# Patient Record
Sex: FEMALE | Race: WHITE
[De-identification: names, ages, dates, MRNs, and addresses within clinical notes are randomized per-mention and may not be internally consistent; named-entity substitution may affect disease eponyms.]

---

## 2017-10-11 LAB
BASOPHILS # BLD: 0.03 K/UL (ref 0–0.2)
BASOPHILS NFR BLD: 0.3 %
BUN SERPL-MCNC: 21 MG/DL (ref 7–18)
CALCIUM SERPL-MCNC: 9.1 MG/DL (ref 8.5–10.1)
CO2 SERPL-SCNC: 27 MMOL/L (ref 21–32)
CREAT SERPL-MCNC: 1.2 MG/DL (ref 0.6–1.2)
EOS ABS #: 0.15 K/UL (ref 0–0.5)
EOSINOPHIL NFR BLD AUTO: 273 K/UL (ref 130–400)
GLUCOSE SERPL-MCNC: 99 MG/DL (ref 70–99)
HCT VFR BLD CALC: 39.8 % (ref 37–47)
HGB BLD-MCNC: 13.1 G/DL (ref 12–16)
IG#: 0.02 K/UL (ref 0–0.02)
IMM GRANULOCYTES NFR BLD AUTO: 26.4 %
INR PPP: 0.9 (ref 0.9–1.1)
LYMPHOCYTES # BLD: 2.28 K/UL (ref 1.2–3.4)
MCH RBC QN AUTO: 30.2 PG (ref 25–34)
MCHC RBC AUTO-ENTMCNC: 32.9 G/DL (ref 32–36)
MCV RBC AUTO: 91.7 FL (ref 80–100)
MONO ABS #: 0.6 K/UL (ref 0.11–0.59)
MONOCYTES NFR BLD: 6.9 %
NEUT ABS #: 5.57 K/UL (ref 1.4–6.5)
NEUTROPHILS # BLD AUTO: 1.7 %
NEUTROPHILS NFR BLD AUTO: 64.5 %
PMV BLD AUTO: 9.5 FL (ref 7.4–10.4)
POTASSIUM SERPL-SCNC: 3.9 MMOL/L (ref 3.5–5.1)
PTT PATIENT: 28.3 SECONDS (ref 21–31)
RED CELL DISTRIBUTION WIDTH CV: 12.9 % (ref 11.5–14.5)
RED CELL DISTRIBUTION WIDTH SD: 43.5 FL (ref 36.4–46.3)
SODIUM SERPL-SCNC: 142 MMOL/L (ref 136–145)
WBC # BLD AUTO: 8.65 K/UL (ref 4.8–10.8)

## 2017-10-11 NOTE — DIAGNOSTIC IMAGING REPORT
CHEST 2 VIEWS ROUTINE



CLINICAL HISTORY: Preoperative chest    



COMPARISON STUDY:  No previous studies for comparison.



FINDINGS: The cardiac and mediastinal contours are normal. There is no evidence

of focal pulmonary consolidation. There is no evidence of failure. No pleural

effusions are visualized.[ There are surgical clips project over the right

axillary region. There are amorphous calcifications project over the right lower

lung zone. It is unclear whether these are intra or extrathoracic.



IMPRESSION: No active disease in the chest.







Electronically signed by:  Porfirio Galvan M.D.

10/11/2017 3:32 PM



Dictated Date/Time:  10/11/2017 3:31 PM

## 2017-10-11 NOTE — PAT MEDICATION INSTRUCTIONS
Service Date


Oct 11, 2017.





Current Home Medication List


Albuterol Hfa (Ventolin Hfa), 2 PUFFS INH QID PRN for RN


Budesonide/Formoterol Fumarate (Symbicort 160/4.5 Inhaler ), 2 PUFFS INH BID


Celecoxib (CeleBREX), 200 MG PO QAM


Cholecalciferol (Vitamin D3), 4,000 UNITS PO 2XWWEEK


Hydrocodone/Acetaminophen 5MG/325MG (Norco 5MG/325MG), 1 TABLET PO TID PRN for N


Lisinopril/Hctz (Zestoretic 20MG/25MG), 1 TAB PO QAM


Montelukast Sodium (Montelukast Sodium), 1 TAB PO QAM


Prednisone (Prednisone), 5 MG PO PRN


Simvastatin (Zocor), 20 MG PO QAM


[Magnesium Ox], 250 MG PO QAM





Medication Instructions


For Your Scheduled Surgery 





- Ask your surgeon for instructions for:


Celecoxib (CeleBREX), 200 MG PO QAM








- Hold the following medications the morning of surgery:


Prednisone (Prednisone), 5 MG PO PRN


Lisinopril/Hctz (Zestoretic 20MG/25MG), 1 TAB PO QAM


Cholecalciferol (Vitamin D3), 4,000 UNITS PO 2XWWEEK


[Magnesium Ox], 250 MG PO QAM








- Take the following medications the morning of surgery with a sip of water:


Simvastatin (Zocor), 20 MG PO QAM


Montelukast Sodium (Montelukast Sodium), 1 TAB PO QAM


Hydrocodone/Acetaminophen 5MG/325MG (Norco 5MG/325MG), 1 TABLET PO TID PRN (if 

needed, can use up to four hours before surgery)


Albuterol Hfa (Ventolin Hfa), 2 PUFFS INH QID PRN for RN (BRING WITH YOU TO THE 

HOSPITAL ON THE DAY OF SURGERY)


Budesonide/Formoterol Fumarate (Symbicort 160/4.5 Inhaler ), 2 PUFFS INH BID








- Take the following medications as scheduled the night before surgery:


Budesonide/Formoterol Fumarate (Symbicort 160/4.5 Inhaler ), 2 PUFFS INH BID


Albuterol Hfa (Ventolin Hfa), 2 PUFFS INH QID PRN for RN


Hydrocodone/Acetaminophen 5MG/325MG (Norco 5MG/325MG), 1 TABLET PO TID PRN











If you have any questions please call us at [phone] or [phone] or 

[phone]

## 2017-11-08 NOTE — HISTORY & PHYSICAL EXAMINATION
DATE OF ADMISSION:  11/10/2017

 

CHIEF COMPLAINT:  Primary osteoarthritis of the right hip.

 

HISTORY OF PRESENT ILLNESS:  Mary is a pleasant 67-year-old female who has

been having a several year history of right hip and groin pain.  We tried

extensive conservative treatment including intraarticular hip injections and

physical therapy, but unfortunately she continues to have pain.  X-rays and

clinical examination have been diagnostic for primary osteoarthritis of the

right hip.  After failing extensive conservative treatment, she elected to

proceed with a right total hip arthroplasty.

 

PAST MEDICAL HISTORY:  Significant for asthma, COPD, osteoarthritis, low back

pain, obesity and breast cancer.

 

PAST SURGICAL HISTORY:  Significant for an appendectomy, right parotidectomy

and lumpectomy.

 

ALLERGIES:  None.

 

MEDICATIONS:  Symbicort 160/4.5 mg 2 puffs twice a day, Ventolin 2 puffs

every 4 hours as needed, Celebrex 200 mg daily, simvastatin 20 mg daily,

Singulair 10 mg daily, lisinopril/HCTZ 20/125 mg daily, and prednisone 5 mg

daily as needed.

 

FAMILY HISTORY:  Noncontributory.

 

SOCIAL HISTORY:  She is .  She has 1-2 drinks a day.  She has a

42-year history of a pack a day smoking, but quit in 2014.  She does little

activity.

 

REVIEW OF SYSTEMS:  She complains of right hip pain.  All other pertinent

review of systems is negative.

 

PHYSICAL EXAMINATION:

GENERAL:  She is awake, alert and oriented x3.  She is in no apparent

distress.  She is very pleasant.

HEENT:  Pupils are equal, round and reactive to light.  Extraocular motion

intact.  Oral mucosa is pink and moist.

HEART:  Regular rate per radial pulse.

LUNGS:  Tawanna symmetrically bilaterally with no audible breath sounds.

ABDOMEN:  Soft, nontender, and nondistended.

MUSCULOSKELETAL:  On physical examination of the right hip, she ambulates

with a cane.  She has significant pain with internal and external rotation of

the right hip.  Her leg lengths are essentially equal.  She is

neurovascularly intact.

 

IMAGING DATA:  X-rays of the right hip do show advanced osteoarthritis with

joint space narrowing and osteophyte formation.

 

IMPRESSION:  Primary osteoarthritis of the right hip.

 

PLAN:  We will proceed with a right total hip arthroplasty.  Postoperatively,

she will be started on aspirin for DVT prophylaxis and kept in the hospital

for postoperative medical management.

## 2017-11-10 ENCOUNTER — HOSPITAL ENCOUNTER (INPATIENT)
Dept: HOSPITAL 45 - C.ACU | Age: 67
LOS: 4 days | Discharge: HOME | DRG: 470 | End: 2017-11-14
Attending: ORTHOPAEDIC SURGERY | Admitting: ORTHOPAEDIC SURGERY
Payer: COMMERCIAL

## 2017-11-10 VITALS
TEMPERATURE: 97.88 F | DIASTOLIC BLOOD PRESSURE: 66 MMHG | HEART RATE: 102 BPM | OXYGEN SATURATION: 96 % | SYSTOLIC BLOOD PRESSURE: 102 MMHG

## 2017-11-10 VITALS
DIASTOLIC BLOOD PRESSURE: 71 MMHG | OXYGEN SATURATION: 99 % | HEART RATE: 80 BPM | TEMPERATURE: 97.52 F | SYSTOLIC BLOOD PRESSURE: 114 MMHG

## 2017-11-10 VITALS
TEMPERATURE: 97.34 F | OXYGEN SATURATION: 100 % | HEART RATE: 80 BPM | SYSTOLIC BLOOD PRESSURE: 104 MMHG | DIASTOLIC BLOOD PRESSURE: 62 MMHG

## 2017-11-10 VITALS
WEIGHT: 205.25 LBS | BODY MASS INDEX: 35.04 KG/M2 | BODY MASS INDEX: 35.04 KG/M2 | HEIGHT: 64 IN | HEIGHT: 64 IN | WEIGHT: 205.25 LBS

## 2017-11-10 VITALS
TEMPERATURE: 97.34 F | SYSTOLIC BLOOD PRESSURE: 112 MMHG | HEART RATE: 92 BPM | DIASTOLIC BLOOD PRESSURE: 73 MMHG | OXYGEN SATURATION: 99 %

## 2017-11-10 VITALS
TEMPERATURE: 98.06 F | OXYGEN SATURATION: 93 % | HEART RATE: 102 BPM | DIASTOLIC BLOOD PRESSURE: 63 MMHG | SYSTOLIC BLOOD PRESSURE: 100 MMHG

## 2017-11-10 VITALS
OXYGEN SATURATION: 94 % | DIASTOLIC BLOOD PRESSURE: 72 MMHG | SYSTOLIC BLOOD PRESSURE: 144 MMHG | HEART RATE: 96 BPM | TEMPERATURE: 98.6 F

## 2017-11-10 VITALS
SYSTOLIC BLOOD PRESSURE: 100 MMHG | TEMPERATURE: 97.34 F | HEART RATE: 86 BPM | DIASTOLIC BLOOD PRESSURE: 66 MMHG | OXYGEN SATURATION: 100 %
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
## 2017-11-10 DIAGNOSIS — M16.11: Primary | ICD-10-CM

## 2017-11-10 DIAGNOSIS — J45.909: ICD-10-CM

## 2017-11-10 DIAGNOSIS — Z85.3: ICD-10-CM

## 2017-11-10 DIAGNOSIS — M96.89: ICD-10-CM

## 2017-11-10 DIAGNOSIS — D62: ICD-10-CM
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
## 2017-11-10 DIAGNOSIS — Z79.899: ICD-10-CM

## 2017-11-10 DIAGNOSIS — Z87.891: ICD-10-CM

## 2017-11-10 DIAGNOSIS — J44.9: ICD-10-CM

## 2017-11-10 DIAGNOSIS — E66.9: ICD-10-CM

## 2017-11-10 PROCEDURE — 0QQ60ZZ REPAIR RIGHT UPPER FEMUR, OPEN APPROACH: ICD-10-PCS | Performed by: ORTHOPAEDIC SURGERY

## 2017-11-10 PROCEDURE — 0SR9049 REPLACEMENT OF RIGHT HIP JOINT WITH CERAMIC ON POLYETHYLENE SYNTHETIC SUBSTITUTE, CEMENTED, OPEN APPROACH: ICD-10-PCS | Performed by: ORTHOPAEDIC SURGERY

## 2017-11-10 RX ADMIN — OXYCODONE HYDROCHLORIDE PRN MG: 5 TABLET ORAL at 16:26

## 2017-11-10 RX ADMIN — MORPHINE SULFATE PRN MG: 2 INJECTION, SOLUTION INTRAMUSCULAR; INTRAVENOUS at 20:35

## 2017-11-10 RX ADMIN — ACETAMINOPHEN SCH MLS/HR: 10 INJECTION, SOLUTION INTRAVENOUS at 23:35

## 2017-11-10 RX ADMIN — STANDARDIZED SENNA CONCENTRATE SCH MG: 8.6 TABLET ORAL at 21:14

## 2017-11-10 RX ADMIN — OXYCODONE HYDROCHLORIDE PRN MG: 5 TABLET ORAL at 21:21

## 2017-11-10 RX ADMIN — CEFAZOLIN SCH MLS/MIN: 10 INJECTION, POWDER, FOR SOLUTION INTRAVENOUS at 17:55

## 2017-11-10 RX ADMIN — BUDESONIDE AND FORMOTEROL FUMARATE DIHYDRATE SCH PUFFS: 160; 4.5 AEROSOL RESPIRATORY (INHALATION) at 21:14

## 2017-11-10 RX ADMIN — SODIUM CHLORIDE SCH MLS/HR: 900 INJECTION, SOLUTION INTRAVENOUS at 16:49

## 2017-11-10 RX ADMIN — Medication SCH MG: at 21:18

## 2017-11-10 RX ADMIN — TRANEXAMIC ACID SCH MLS/MIN: 100 INJECTION, SOLUTION INTRAVENOUS at 16:11

## 2017-11-10 RX ADMIN — DOCUSATE SODIUM SCH MG: 100 CAPSULE, LIQUID FILLED ORAL at 21:13

## 2017-11-10 RX ADMIN — TRANEXAMIC ACID SCH MLS/MIN: 100 INJECTION, SOLUTION INTRAVENOUS at 06:43

## 2017-11-10 RX ADMIN — ACETAMINOPHEN SCH MLS/HR: 10 INJECTION, SOLUTION INTRAVENOUS at 16:46

## 2017-11-10 NOTE — OPERATIVE REPORT
DATE OF OPERATION:  11/10/2017

 

PREOPERATIVE DIAGNOSIS:  Primary osteoarthritis of the right hip.

 

POSTOPERATIVE DIAGNOSIS:  Same.

 

PROCEDURE:  Right total hip arthroplasty.  

 

SURGEON:  Dr. Magdiel Kirby.

 

ASSISTANT:  Andre Evans PA-C.  

 

ANESTHESIA:  Spinal that was converted to general.

 

COMPLICATIONS:  Small  crack in the proximal femur that was treated with a

cerclage wire and then a long cemented stem.

 

CONDITION:  Stable to PACU.

 

IMPLANTS USED:  I used a George CRC size 13 x 170 cemented stem with a size

32  ceramic head with a -3 neck and a 48 mm cup with a size 32 E-poly liner

and a single 20 mm screw.

 

INDICATIONS:  Mary is a pleasant 67-year-old female who presented to my

office with chronic severe right hip and groin pain.  X-rays and clinical

examination were diagnostic for severe osteoarthritis of the right hip. 

After failing conservative treatment, she elected to undergo a right total

hip arthroplasty.

 

OPERATION AND FINDINGS:  On 11/10/2017 she arrived at [facility]

for the above procedure.  She was seen in the preoperative holding area and

the operative extremity is identified and signed.  She was given a

preoperative antibiotic and a spinal anesthetic.  She was taken back to the

operating room, laid on the table in supine position and put under basic

sedation.  The right hip was then brought out to a Purist leg positioner. 

The right hip was then prepped and draped in sterile fashion.  Time-out was

done and the patient and operative extremity was properly identified.

 

An anterior approach was used.  Dissection was taken down through the fascia

and the tensor muscle was retracted laterally and the rectus was retracted

medially.  The circumflex vessels were ligated and the capsule was incised

and tagged for later repair.  The femoral neck was then resected and the

acetabulum was exposed.  Sequential reaming up to a size 47 reamer was done. 

This gave good circumferential  bleeding bone.  A 48 mm cup was then impacted

into place.  A single 20 mm screw was placed and E-poly liner was snapped

into place.  The cup positioning was checked under fluoroscopy.  The proximal

femur was then exposed.  The canal was opened and a single broach was placed

down the canal.  I was not real happy with the alignment so I got an x-ray. 

The x-rays showed that the broach had penetrated the posterolateral cortex. 

Unfortunately I then noticed a crack going vertically up through the greater

trochanter.  I placed a cerclage wire around the proximal femur.  I then did

sequential broaching and the broaching looked good.  I decided to cement down

the stem just for added stability.  The stem was cemented and x-rays showed

that the cemented stem had also gone out the posterolateral cortex.  The stem

was then removed and the cement was removed with an Ultra-Drive device.  Once

this cement was removed a size 13 x 170 George CRC long stem stem was

trialed.  I was able to easily get it down the canal.  A size 32 -3 neck was

then trialed and the hip was reduced.  Fluoroscopic x-rays showed anatomic

alignment.  The final size 13 x 170 George stem was then cemented in place. 

A 32 mm ceramic head with -3 neck was then impacted into place.  The hip was

reduced.  Final fluoroscopic x-rays were taken.  I was happy with the overall

alignment, with the version and the leg lengths were back anatomically.  The

surrounding soft tissues were injected with 100 mL orthopedic pain control

cocktail.  The surrounding soft tissues were then irrigated with 3 liters of

normal saline solution with bacitracin.  Midway through the case, all gloves

and gowns were changed and she got another dose of antibiotics and we were

about 2-1/2 hours into the case.  A drain was placed.  The fascia was then

closed with #1 PDS suture.  Skin was closed with 3-0 Vicryl,  3-0 V-Loc

suture and staples.  Prinovac dressing was placed.  She did have a little

abrasion at the superior aspect of her incision that was documented.  She was

then extubated and taken to the postanesthesia care unit in stable condition.

 She tolerated the procedure well.

 

 

I attest to the content of the Intraoperative Record and any orders documented 
therein. Any exceptions are noted below.

 

 

 

JIM

## 2017-11-10 NOTE — DIAGNOSTIC IMAGING REPORT
AP PELVIS, CROSSTABLE LATERAL RIGHT HIP



History: Right total hip arthroplasty. Degenerative arthritis. Postop.



FINDINGS: The patient is status post a right total hip arthroplasty. The

hardware is intact. No fracture or dislocation. Skin staples and surgical drains

are in place.



IMPRESSION:  

Right total hip arthroplasty. No evidence for hardware complication







Electronically signed by:  Subhash Qureshi M.D.

11/10/2017 2:25 PM



Dictated Date/Time:  11/10/2017 2:23 PM

## 2017-11-10 NOTE — [facility] POST OPERATIVE BRIEF NOTE
Immediate Operative Summary


Operative Date


Nov 10, 2017.





Pre-Operative Diagnosis





Primary Osteoarthritis of Right Hip





Post-Operative Diagnosis





Same as preoperative





Procedure(s) Performed





Right Anterior Total Hip Arthroplasty, Cemented, with Eh Garcia





Surgeon


Dr. Magdiel Kirby





Assistant Surgeon(s)


Andre Evans PA-C





Estimated Blood Loss


500 ml





Findings


as above





Specimens





A.) Right Femoral Head





Complication(s)


small crack around proximal femur that required cabling then a cemented stem





Disposition


Recovery Room / PACU

## 2017-11-10 NOTE — DIAGNOSTIC IMAGING REPORT
R HIP UNILATERAL 1 VIEW



CLINICAL HISTORY: RT ANTERIOR TOTAL hip arthroplasty.



COMPARISON STUDY:  Right hip 10/11/2017.



FINDINGS: Total fluoroscopy time was 1 minute and 27 seconds. 4 fluoroscopic

spot images of the right hip. There is a right total hip arthroplasty. The

hardware appears intact. No fracture or dislocation.



IMPRESSION:  Fluoroscopy provided for right total hip arthroplasty. 





 







Electronically signed by:  Subhash Qureshi M.D.

11/10/2017 1:23 PM



Dictated Date/Time:  11/10/2017 1:22 PM

## 2017-11-10 NOTE — ANESTHESIOLOGY PROGRESS NOTE
Anesthesia Post Op Note


Date & Time


Nov 10, 2017 at 14:58





Vital Signs


Pain Intensity:  4





Vital Signs Past 12 Hours








  Date Time  Temp Pulse Resp B/P (MAP) Pulse Ox O2 Delivery O2 Flow Rate FiO2


 


11/10/17 14:55  79 13 104/60 94 Nasal Cannula 2 


 


11/10/17 14:45  77 14 108/71 98 Nasal Cannula 2 


 


11/10/17 14:35  74 15 97/66 99 Nasal Cannula 2 


 


11/10/17 14:25  69 14 100/65 100 Oxymask 10 


 


11/10/17 14:15  76 18 129/57 100 Oxymask 10 


 


11/10/17 14:07 36.0 70 16 123/71 100 Oxymask 10 


 


11/10/17 05:40 37 96 20 144/72 94 Room Air  











Notes


Mental Status:  alert / awake / arousable, participated in evaluation


Pt Amnestic to Procedure:  Yes


Nausea / Vomiting:  adequately controlled


Pain:  adequately controlled


Airway Patency, RR, SpO2:  stable & adequate


BP & HR:  stable & adequate


Hydration State:  stable & adequate


Neuraxial Anesthesia:  was administered, sensory block is resolving


Anesthetic Complications:  no major complications apparent

## 2017-11-11 VITALS
HEART RATE: 105 BPM | DIASTOLIC BLOOD PRESSURE: 62 MMHG | SYSTOLIC BLOOD PRESSURE: 104 MMHG | OXYGEN SATURATION: 93 % | TEMPERATURE: 98.24 F

## 2017-11-11 VITALS
SYSTOLIC BLOOD PRESSURE: 99 MMHG | TEMPERATURE: 98.06 F | OXYGEN SATURATION: 92 % | HEART RATE: 103 BPM | DIASTOLIC BLOOD PRESSURE: 56 MMHG

## 2017-11-11 VITALS
HEART RATE: 107 BPM | SYSTOLIC BLOOD PRESSURE: 101 MMHG | DIASTOLIC BLOOD PRESSURE: 63 MMHG | OXYGEN SATURATION: 98 % | TEMPERATURE: 98.24 F

## 2017-11-11 VITALS — TEMPERATURE: 98.6 F | DIASTOLIC BLOOD PRESSURE: 65 MMHG | SYSTOLIC BLOOD PRESSURE: 103 MMHG | HEART RATE: 103 BPM

## 2017-11-11 VITALS
DIASTOLIC BLOOD PRESSURE: 67 MMHG | OXYGEN SATURATION: 98 % | TEMPERATURE: 97.88 F | SYSTOLIC BLOOD PRESSURE: 126 MMHG | HEART RATE: 96 BPM

## 2017-11-11 VITALS
HEART RATE: 112 BPM | DIASTOLIC BLOOD PRESSURE: 64 MMHG | SYSTOLIC BLOOD PRESSURE: 122 MMHG | OXYGEN SATURATION: 93 % | TEMPERATURE: 97.88 F

## 2017-11-11 VITALS
SYSTOLIC BLOOD PRESSURE: 110 MMHG | TEMPERATURE: 98.24 F | HEART RATE: 98 BPM | DIASTOLIC BLOOD PRESSURE: 66 MMHG | OXYGEN SATURATION: 94 %

## 2017-11-11 VITALS
TEMPERATURE: 98.24 F | DIASTOLIC BLOOD PRESSURE: 70 MMHG | SYSTOLIC BLOOD PRESSURE: 107 MMHG | HEART RATE: 105 BPM | OXYGEN SATURATION: 95 %

## 2017-11-11 VITALS
OXYGEN SATURATION: 93 % | SYSTOLIC BLOOD PRESSURE: 107 MMHG | DIASTOLIC BLOOD PRESSURE: 55 MMHG | TEMPERATURE: 98.06 F | HEART RATE: 103 BPM

## 2017-11-11 VITALS
DIASTOLIC BLOOD PRESSURE: 65 MMHG | SYSTOLIC BLOOD PRESSURE: 103 MMHG | TEMPERATURE: 98.6 F | OXYGEN SATURATION: 93 % | HEART RATE: 100 BPM

## 2017-11-11 LAB
BASOPHILS # BLD: 0.01 K/UL (ref 0–0.2)
BASOPHILS NFR BLD: 0.1 %
BUN SERPL-MCNC: 27 MG/DL (ref 7–18)
CALCIUM SERPL-MCNC: 7 MG/DL (ref 8.5–10.1)
CO2 SERPL-SCNC: 24 MMOL/L (ref 21–32)
CREAT SERPL-MCNC: 1.33 MG/DL (ref 0.6–1.2)
EOS ABS #: 0 K/UL (ref 0–0.5)
EOSINOPHIL NFR BLD AUTO: 193 K/UL (ref 130–400)
GLUCOSE SERPL-MCNC: 139 MG/DL (ref 70–99)
HCT VFR BLD CALC: 25.2 % (ref 37–47)
HGB BLD-MCNC: 8.5 G/DL (ref 12–16)
IG#: 0.05 K/UL (ref 0–0.02)
IMM GRANULOCYTES NFR BLD AUTO: 7.5 %
LYMPHOCYTES # BLD: 1.06 K/UL (ref 1.2–3.4)
MCH RBC QN AUTO: 31 PG (ref 25–34)
MCHC RBC AUTO-ENTMCNC: 33.7 G/DL (ref 32–36)
MCV RBC AUTO: 92 FL (ref 80–100)
MONO ABS #: 1.29 K/UL (ref 0.11–0.59)
MONOCYTES NFR BLD: 9.1 %
NEUT ABS #: 11.75 K/UL (ref 1.4–6.5)
NEUTROPHILS # BLD AUTO: 0 %
NEUTROPHILS NFR BLD AUTO: 82.9 %
PMV BLD AUTO: 9.6 FL (ref 7.4–10.4)
POTASSIUM SERPL-SCNC: 4.5 MMOL/L (ref 3.5–5.1)
RED CELL DISTRIBUTION WIDTH CV: 13.5 % (ref 11.5–14.5)
RED CELL DISTRIBUTION WIDTH SD: 45.5 FL (ref 36.4–46.3)
SODIUM SERPL-SCNC: 139 MMOL/L (ref 136–145)
WBC # BLD AUTO: 14.16 K/UL (ref 4.8–10.8)

## 2017-11-11 RX ADMIN — DOCUSATE SODIUM SCH MG: 100 CAPSULE, LIQUID FILLED ORAL at 22:12

## 2017-11-11 RX ADMIN — LISINOPRIL AND HYDROCHLOROTHIAZIDE SCH TAB: 25; 20 TABLET ORAL at 07:51

## 2017-11-11 RX ADMIN — SIMVASTATIN SCH MG: 20 TABLET, FILM COATED ORAL at 07:51

## 2017-11-11 RX ADMIN — SODIUM CHLORIDE SCH MLS/HR: 900 INJECTION, SOLUTION INTRAVENOUS at 02:38

## 2017-11-11 RX ADMIN — CEFAZOLIN SCH MLS/MIN: 10 INJECTION, POWDER, FOR SOLUTION INTRAVENOUS at 02:38

## 2017-11-11 RX ADMIN — DOCUSATE SODIUM SCH MG: 100 CAPSULE, LIQUID FILLED ORAL at 07:50

## 2017-11-11 RX ADMIN — BUDESONIDE AND FORMOTEROL FUMARATE DIHYDRATE SCH PUFFS: 160; 4.5 AEROSOL RESPIRATORY (INHALATION) at 07:49

## 2017-11-11 RX ADMIN — ACETAMINOPHEN SCH MLS/HR: 10 INJECTION, SOLUTION INTRAVENOUS at 07:42

## 2017-11-11 RX ADMIN — KETOROLAC TROMETHAMINE SCH MG: 15 INJECTION INTRAMUSCULAR; INTRAVENOUS at 22:15

## 2017-11-11 RX ADMIN — OXYCODONE HYDROCHLORIDE PRN MG: 5 TABLET ORAL at 18:12

## 2017-11-11 RX ADMIN — PANTOPRAZOLE SCH MG: 40 TABLET, DELAYED RELEASE ORAL at 07:51

## 2017-11-11 RX ADMIN — SODIUM CHLORIDE SCH MLS/HR: 900 INJECTION, SOLUTION INTRAVENOUS at 13:02

## 2017-11-11 RX ADMIN — MORPHINE SULFATE PRN MG: 2 INJECTION, SOLUTION INTRAMUSCULAR; INTRAVENOUS at 07:43

## 2017-11-11 RX ADMIN — BUDESONIDE AND FORMOTEROL FUMARATE DIHYDRATE SCH PUFFS: 160; 4.5 AEROSOL RESPIRATORY (INHALATION) at 22:11

## 2017-11-11 RX ADMIN — OXYCODONE HYDROCHLORIDE PRN MG: 5 TABLET ORAL at 11:31

## 2017-11-11 RX ADMIN — MONTELUKAST SODIUM SCH MG: 10 TABLET, FILM COATED ORAL at 07:51

## 2017-11-11 RX ADMIN — KETOROLAC TROMETHAMINE SCH MG: 15 INJECTION INTRAMUSCULAR; INTRAVENOUS at 13:45

## 2017-11-11 RX ADMIN — Medication SCH TAB: at 07:50

## 2017-11-11 RX ADMIN — ACETAMINOPHEN SCH MG: 500 TABLET, COATED ORAL at 22:13

## 2017-11-11 RX ADMIN — Medication SCH MG: at 07:50

## 2017-11-11 RX ADMIN — OXYCODONE HYDROCHLORIDE PRN MG: 5 TABLET ORAL at 23:57

## 2017-11-11 RX ADMIN — STANDARDIZED SENNA CONCENTRATE SCH MG: 8.6 TABLET ORAL at 22:12

## 2017-11-11 RX ADMIN — Medication SCH MG: at 22:12

## 2017-11-12 VITALS
OXYGEN SATURATION: 97 % | TEMPERATURE: 98.24 F | DIASTOLIC BLOOD PRESSURE: 89 MMHG | HEART RATE: 105 BPM | SYSTOLIC BLOOD PRESSURE: 128 MMHG

## 2017-11-12 VITALS
OXYGEN SATURATION: 92 % | DIASTOLIC BLOOD PRESSURE: 60 MMHG | TEMPERATURE: 98.42 F | SYSTOLIC BLOOD PRESSURE: 92 MMHG | HEART RATE: 97 BPM

## 2017-11-12 VITALS
OXYGEN SATURATION: 92 % | TEMPERATURE: 97.88 F | HEART RATE: 89 BPM | DIASTOLIC BLOOD PRESSURE: 60 MMHG | SYSTOLIC BLOOD PRESSURE: 98 MMHG

## 2017-11-12 VITALS — SYSTOLIC BLOOD PRESSURE: 113 MMHG | DIASTOLIC BLOOD PRESSURE: 63 MMHG

## 2017-11-12 LAB
HCT VFR BLD CALC: 25.7 % (ref 37–47)
HGB BLD-MCNC: 8.5 G/DL (ref 12–16)

## 2017-11-12 RX ADMIN — OXYCODONE HYDROCHLORIDE PRN MG: 5 TABLET ORAL at 10:28

## 2017-11-12 RX ADMIN — BUDESONIDE AND FORMOTEROL FUMARATE DIHYDRATE SCH PUFFS: 160; 4.5 AEROSOL RESPIRATORY (INHALATION) at 08:44

## 2017-11-12 RX ADMIN — KETOROLAC TROMETHAMINE SCH MG: 15 INJECTION INTRAMUSCULAR; INTRAVENOUS at 21:42

## 2017-11-12 RX ADMIN — DOCUSATE SODIUM SCH MG: 100 CAPSULE, LIQUID FILLED ORAL at 21:00

## 2017-11-12 RX ADMIN — LISINOPRIL AND HYDROCHLOROTHIAZIDE SCH TAB: 25; 20 TABLET ORAL at 08:46

## 2017-11-12 RX ADMIN — SIMVASTATIN SCH MG: 20 TABLET, FILM COATED ORAL at 08:46

## 2017-11-12 RX ADMIN — MONTELUKAST SODIUM SCH MG: 10 TABLET, FILM COATED ORAL at 08:46

## 2017-11-12 RX ADMIN — BUDESONIDE AND FORMOTEROL FUMARATE DIHYDRATE SCH PUFFS: 160; 4.5 AEROSOL RESPIRATORY (INHALATION) at 21:39

## 2017-11-12 RX ADMIN — OXYCODONE HYDROCHLORIDE PRN MG: 5 TABLET ORAL at 05:17

## 2017-11-12 RX ADMIN — OXYCODONE HYDROCHLORIDE PRN MG: 5 TABLET ORAL at 14:52

## 2017-11-12 RX ADMIN — DOCUSATE SODIUM SCH MG: 100 CAPSULE, LIQUID FILLED ORAL at 08:45

## 2017-11-12 RX ADMIN — PANTOPRAZOLE SCH MG: 40 TABLET, DELAYED RELEASE ORAL at 08:46

## 2017-11-12 RX ADMIN — Medication SCH MG: at 08:52

## 2017-11-12 RX ADMIN — ACETAMINOPHEN SCH MG: 500 TABLET, COATED ORAL at 05:17

## 2017-11-12 RX ADMIN — Medication SCH TAB: at 08:46

## 2017-11-12 RX ADMIN — KETOROLAC TROMETHAMINE SCH MG: 15 INJECTION INTRAMUSCULAR; INTRAVENOUS at 14:53

## 2017-11-12 RX ADMIN — KETOROLAC TROMETHAMINE SCH MG: 15 INJECTION INTRAMUSCULAR; INTRAVENOUS at 06:33

## 2017-11-12 RX ADMIN — Medication SCH MG: at 21:39

## 2017-11-12 RX ADMIN — Medication SCH MG: at 08:45

## 2017-11-12 RX ADMIN — OXYCODONE HYDROCHLORIDE PRN MG: 5 TABLET ORAL at 21:41

## 2017-11-12 RX ADMIN — ACETAMINOPHEN SCH MG: 500 TABLET, COATED ORAL at 21:40

## 2017-11-12 RX ADMIN — ACETAMINOPHEN SCH MG: 500 TABLET, COATED ORAL at 14:52

## 2017-11-12 RX ADMIN — STANDARDIZED SENNA CONCENTRATE SCH MG: 8.6 TABLET ORAL at 15:59

## 2017-11-12 NOTE — DISCHARGE INSTRUCTIONS
Discharge Instructions


Date of Service


Nov 12, 2017.





Admission


Reason for Admission:  Right Hip Osteoarthritis





Discharge


Discharge Diagnosis / Problem:  Right Total Hip





Discharge Goals


Goal(s):  Decrease discomfort, Improve function





Activity Recommendations


Activity Limitations:  as noted below





.





Instructions / Follow-Up


Instructions / Follow-Up





Activity and Therapy Recommendations:





* If you are using [facility] then Physical Therapy will be provided 

until they feel you are ready to start Outpatient Physical Therapy.  If you are 

not using a Home Health agency then Outpatient Physical Therapy should start 

about 3-5 days from your day of surgery.  Therapy will last about 3-6 weeks





* You were shown a series of exercises in the hospital. Do these exercises 

three times each day including the exercises you were shown in physical therapy.





* Get up and walk several times each day.~ For the first four weeks, try not to 

stand or walk for more than one hour at a time. If you do stand or walk for 

more than one hour, you will not hurt anything, but your leg will likely swell.~

~





* As you feel comfortable, you may change from the walker or crutches to a cane 

and~then to independent walking.





Medications:





* Narcotic  You will likely be sent home from the hospital with a prescription 

for the narcotic pain medication that worked best throughout your stay.





* Aspirin  Most patients will be required to take Aspirin 325mg twice a day for 

6 weeks after surgery.  This is obtained over-the-counter and a prescription is 

not necessary.  





* Other medications may be prescribed for specific circumstances.  If you have 

any questions, please call the office at [phone].





* Resume previous home medications unless otherwise instructed








TEDs/Elastic Stockings:





The white elastic stockings help limit swelling and prevent blood clots from 

forming in your legs. The more you wear them, the more they work.  Wear them 

for six weeks.





Dressing Care:





Leave the vac dressing on for 10 days





Showering:





Please do not shower with the vac dressing in place.  May bathe, but keep the 

vac dry.





Things To Watch For:





* Drainage from the incision site that occurs more than one week after your 

surgery.





* Increased redness at the incision site.





* Fever above 102 degrees Fahrenheit.





* Unusual chest pain or shortness of breath.





* Call [facility] at [phone] with any of the above 

problems 





Follow-Up Visit:





Follow-up with Dr. Kirby on Monday (10 days from the surgery).  An 

appointment was probably scheduled when you signed-up for surgery in the 

office.  If you have any questions call [phone]





Office Instructions:





More detailed instructions as well as Frequently Asked Questions were provided 

in a folder by our office when you signed-up for surgery.  Please review these 

instructions when you get home.  If you have any further questions or concerns, 

please feel free to call the office at [phone]





Current Hospital Diet


Patient's current hospital diet: Regular Diet





Discharge Diet


Recommended Diet:  Regular Diet





Procedures


Procedures Performed:  


Right Anterior Total Hip Arthroplasty, Cemented, with Eh Tanner Cabling





Pending Studies


Studies pending at discharge:  no





Medical Emergencies








.


Who to Call and When:





Medical Emergencies:  If at any time you feel your situation is an emergency, 

please call 911 immediately.





.





Non-Emergent Contact


Non-Emergency issues call your:  Surgeon


Call Non-Emergent contact if:  wound has increased drainage, wound has 

increased redness


.








"Provider Documentation" section prepared by Magdiel Kirby.








.





VTE Core Measure


Inpt VTE Proph given/why not?:  Other Anticoagulation (Aspirin 325 twice a day 

for 6 weeks)

## 2017-11-13 VITALS
HEART RATE: 101 BPM | SYSTOLIC BLOOD PRESSURE: 111 MMHG | TEMPERATURE: 98.42 F | DIASTOLIC BLOOD PRESSURE: 67 MMHG | OXYGEN SATURATION: 95 %

## 2017-11-13 VITALS
DIASTOLIC BLOOD PRESSURE: 71 MMHG | HEART RATE: 100 BPM | OXYGEN SATURATION: 97 % | TEMPERATURE: 98.6 F | SYSTOLIC BLOOD PRESSURE: 106 MMHG

## 2017-11-13 VITALS
OXYGEN SATURATION: 97 % | TEMPERATURE: 98.6 F | SYSTOLIC BLOOD PRESSURE: 106 MMHG | DIASTOLIC BLOOD PRESSURE: 67 MMHG | HEART RATE: 100 BPM

## 2017-11-13 VITALS
OXYGEN SATURATION: 95 % | TEMPERATURE: 97.88 F | SYSTOLIC BLOOD PRESSURE: 106 MMHG | DIASTOLIC BLOOD PRESSURE: 63 MMHG | HEART RATE: 81 BPM

## 2017-11-13 VITALS
HEART RATE: 93 BPM | OXYGEN SATURATION: 96 % | SYSTOLIC BLOOD PRESSURE: 108 MMHG | DIASTOLIC BLOOD PRESSURE: 70 MMHG | TEMPERATURE: 98.24 F

## 2017-11-13 VITALS
DIASTOLIC BLOOD PRESSURE: 71 MMHG | OXYGEN SATURATION: 95 % | SYSTOLIC BLOOD PRESSURE: 115 MMHG | HEART RATE: 101 BPM | TEMPERATURE: 98.24 F

## 2017-11-13 VITALS
DIASTOLIC BLOOD PRESSURE: 73 MMHG | SYSTOLIC BLOOD PRESSURE: 118 MMHG | HEART RATE: 97 BPM | OXYGEN SATURATION: 95 % | TEMPERATURE: 97.88 F

## 2017-11-13 VITALS
OXYGEN SATURATION: 97 % | SYSTOLIC BLOOD PRESSURE: 106 MMHG | TEMPERATURE: 98.24 F | DIASTOLIC BLOOD PRESSURE: 67 MMHG | HEART RATE: 90 BPM

## 2017-11-13 VITALS
HEART RATE: 96 BPM | OXYGEN SATURATION: 96 % | SYSTOLIC BLOOD PRESSURE: 103 MMHG | DIASTOLIC BLOOD PRESSURE: 67 MMHG | TEMPERATURE: 97.52 F

## 2017-11-13 VITALS
HEART RATE: 97 BPM | TEMPERATURE: 98.6 F | DIASTOLIC BLOOD PRESSURE: 71 MMHG | OXYGEN SATURATION: 97 % | SYSTOLIC BLOOD PRESSURE: 113 MMHG

## 2017-11-13 VITALS
DIASTOLIC BLOOD PRESSURE: 77 MMHG | OXYGEN SATURATION: 96 % | SYSTOLIC BLOOD PRESSURE: 131 MMHG | HEART RATE: 100 BPM | TEMPERATURE: 98.24 F

## 2017-11-13 VITALS
OXYGEN SATURATION: 96 % | HEART RATE: 99 BPM | TEMPERATURE: 98.06 F | DIASTOLIC BLOOD PRESSURE: 62 MMHG | SYSTOLIC BLOOD PRESSURE: 94 MMHG

## 2017-11-13 VITALS
DIASTOLIC BLOOD PRESSURE: 67 MMHG | OXYGEN SATURATION: 95 % | SYSTOLIC BLOOD PRESSURE: 110 MMHG | TEMPERATURE: 98.42 F | HEART RATE: 91 BPM

## 2017-11-13 VITALS
HEART RATE: 93 BPM | TEMPERATURE: 97.88 F | DIASTOLIC BLOOD PRESSURE: 63 MMHG | OXYGEN SATURATION: 96 % | SYSTOLIC BLOOD PRESSURE: 100 MMHG

## 2017-11-13 VITALS — SYSTOLIC BLOOD PRESSURE: 124 MMHG | TEMPERATURE: 98.24 F | DIASTOLIC BLOOD PRESSURE: 73 MMHG | HEART RATE: 98 BPM

## 2017-11-13 VITALS — OXYGEN SATURATION: 95 %

## 2017-11-13 LAB
HCT VFR BLD CALC: 24.5 % (ref 37–47)
HGB BLD-MCNC: 7.9 G/DL (ref 12–16)

## 2017-11-13 RX ADMIN — KETOROLAC TROMETHAMINE SCH MG: 15 INJECTION INTRAMUSCULAR; INTRAVENOUS at 13:34

## 2017-11-13 RX ADMIN — BUDESONIDE AND FORMOTEROL FUMARATE DIHYDRATE SCH PUFFS: 160; 4.5 AEROSOL RESPIRATORY (INHALATION) at 08:25

## 2017-11-13 RX ADMIN — LISINOPRIL AND HYDROCHLOROTHIAZIDE SCH TAB: 25; 20 TABLET ORAL at 08:27

## 2017-11-13 RX ADMIN — PANTOPRAZOLE SCH MG: 40 TABLET, DELAYED RELEASE ORAL at 08:27

## 2017-11-13 RX ADMIN — MONTELUKAST SODIUM SCH MG: 10 TABLET, FILM COATED ORAL at 08:27

## 2017-11-13 RX ADMIN — OXYCODONE HYDROCHLORIDE PRN MG: 5 TABLET ORAL at 06:01

## 2017-11-13 RX ADMIN — OXYCODONE HYDROCHLORIDE PRN MG: 5 TABLET ORAL at 21:27

## 2017-11-13 RX ADMIN — DOCUSATE SODIUM SCH MG: 100 CAPSULE, LIQUID FILLED ORAL at 21:26

## 2017-11-13 RX ADMIN — KETOROLAC TROMETHAMINE SCH MG: 15 INJECTION INTRAMUSCULAR; INTRAVENOUS at 21:23

## 2017-11-13 RX ADMIN — Medication SCH MG: at 21:26

## 2017-11-13 RX ADMIN — ACETAMINOPHEN SCH MG: 500 TABLET, COATED ORAL at 21:26

## 2017-11-13 RX ADMIN — KETOROLAC TROMETHAMINE SCH MG: 15 INJECTION INTRAMUSCULAR; INTRAVENOUS at 05:41

## 2017-11-13 RX ADMIN — ACETAMINOPHEN SCH MG: 500 TABLET, COATED ORAL at 05:41

## 2017-11-13 RX ADMIN — Medication SCH TAB: at 08:27

## 2017-11-13 RX ADMIN — Medication SCH MG: at 08:26

## 2017-11-13 RX ADMIN — OXYCODONE HYDROCHLORIDE PRN MG: 5 TABLET ORAL at 13:27

## 2017-11-13 RX ADMIN — SIMVASTATIN SCH MG: 20 TABLET, FILM COATED ORAL at 08:28

## 2017-11-13 RX ADMIN — DOCUSATE SODIUM SCH MG: 100 CAPSULE, LIQUID FILLED ORAL at 08:25

## 2017-11-13 RX ADMIN — BUDESONIDE AND FORMOTEROL FUMARATE DIHYDRATE SCH PUFFS: 160; 4.5 AEROSOL RESPIRATORY (INHALATION) at 21:25

## 2017-11-13 RX ADMIN — ACETAMINOPHEN SCH MG: 500 TABLET, COATED ORAL at 13:27

## 2017-11-13 RX ADMIN — STANDARDIZED SENNA CONCENTRATE SCH MG: 8.6 TABLET ORAL at 21:26

## 2017-11-13 NOTE — PROGRESS NOTE
DATE: 11/12/2017

 

CHIEF COMPLAINT:  Status post right total hip arthroplasty, postop day #2.

 

PROGRESS:  Mary was seen and examined at bedside today.  She feels better

today.  She had a unit of blood yesterday and did not get up with physical

therapy, but she says overall her hip is feeling better.  She has been up and

ambulating around the room and going to the bathroom, but she has not been to

the hallway yet.  She has some soreness in her hip, but it is not too bad. 

She has no other complaints.

 

PHYSICAL EXAMINATION:

RIGHT HIP:  The dressing has been changed and the Prinovac dressing is in

place.  She does have a few little blisters around the area.  She also has a

small blister on her left calf that was pointed out to me by the nursing

staff.  She has active dorsiflexion and plantarflexion of her right ankle and

leg lengths are equal.

 

LABORATORY DATA:  She has an H&H today of 8.5 and 25.7.  Her vital signs were

all stable on room air.  She is a little hypotensive.  She is voiding on her

own and has had a bowel movement.

 

IMPRESSION:  Status post right total hip arthroplasty, postop day #2.

 

PLAN:  She will be seen by physical therapy today for ambulation.  We will

keep her in the hospital today for pain control as well.  We will get another

H&H tomorrow morning.  It they had dropped considerably, then we might

consider another unit of packed red blood cells.  As long as it looks okay,

she may be ready for discharge to home.  The Prinovac dressing will be on

for 10 days.

 

 

 

 

JIM

## 2017-11-13 NOTE — PROGRESS NOTE
DATE: 11/11/2017

 

CHIEF COMPLAINT:  Status post right total hip arthroplasty postop day #1.

 

PROGRESS:  Mary was seen and examined at bedside today.  Overall, she is

doing okay.  She has lost soreness in her hip.  She tried to get up a little

bit yesterday, but she was feeling a little bit lightheaded.  She was able to

get up and use the commode.  She has not been doing much ambulating.  She

feels a little bit wash out this morning.

 

PHYSICAL EXAMINATION:

VITAL SIGNS:  Mostly stable on room air.  She is a little tachycardic and she

is voiding on her own.

RIGHT LEG:  The Prinovac is in place and to good suction.  There is

also a drain in place into good suction.  Her leg lengths are essentially

equal.  She has active dorsiflexion and plantarflexion of her right ankle and

sensation is intact to her quad.

 

DATA:  She has an H&H today of 8.5 and 25.2.  Her glucose is 139.  Her

creatinine is 133.

 

X-rays postoperatively of the right hip show a well placed, well cemented

right total hip arthroplasty.  The cup is in good alignment.  The overall leg

length was restored and the versions looked good:  There is a crack in the

proximal femur that extends up into the greater trochanter.  There is a

well-placed cerclage wire.

 

IMPRESSION:

1.  Status post right total hip arthroplasty postoperative day #1.

2.  Acute blood loss anemia.

 

PLAN:  I went over everything with her again at bedside today.  I went over

the crack in the femur that extends up into the greater trochanter, I went

over the cerclage wire and the long length cemented stem.  She has good

stability of the hip. I am happy with the overall alignment of the hip but

still want to be a little cautious with the greater trochanteric piece.  I am

going to continue to follow hip precautions with her, but she can be up and

weightbearing as tolerated.  I do want to give her 1 unit of packed red blood

cells today because she is feeling a little bit symptomatic from the anemia

and I am concerned she might drop a little bit more tomorrow.  We will

continue aspirin 325 mg twice a day for DVT prophylaxis as long as she can

get up and continue walking around.  We will continue to follow her closely.

 

 

 

 

JIM

## 2017-11-13 NOTE — ANESTHESIOLOGY PROGRESS NOTE
Anesthesia Post Op Note


Date & Time


Nov 13, 2017 at 08:02





Vital Signs





Vital Signs Past 12 Hours








  Date Time  Temp Pulse Resp B/P (MAP) Pulse Ox O2 Delivery O2 Flow Rate FiO2


 


11/13/17 07:52      Room Air  


 


11/13/17 06:10 36.6 97 16 118/73 (88) 95 Room Air  


 


11/12/17 23:15      Room Air  


 


11/12/17 23:10 36.9 97 16 92/60 (71) 92 Room Air  











Notes


Mental Status:  alert / awake / arousable, participated in evaluation


Pt Amnestic to Procedure:  Yes


Nausea / Vomiting:  adequately controlled


Pain:  adequately controlled


Airway Patency, RR, SpO2:  stable & adequate


BP & HR:  stable & adequate


Hydration State:  stable & adequate


Neuraxial Anesthesia:  sensory block resolved


Anesthetic Complications:  no major complications apparent

## 2017-11-13 NOTE — PROGRESS NOTE
DATE: 11/13/2017

 

CHIEF COMPLAINT:  Status post right total hip arthroplasty postop day #3.

 

PROGRESS:  Mary was seen and examined at bedside today.  Overall, she is

doing better.  She got 2 units of packed red blood cells today and she is

feeling much better.  She was up and ambulated about 225 feet today with

therapy and was also able to do some stairs.  She is feeling better and ready

to go home tomorrow.  She has no complaints.

 

PHYSICAL EXAMINATION:  The Prineo VAC dressing is to suction.  Her leg

lengths are essentially equal.  She has active dorsiflexion and

plantarflexion of her right ankle.  Sensation is intact throughout.

 

LABORATORY DATA:  She has an H&H this morning of 7.9 and 24.5.  We will get

another H&H tomorrow.

 

IMPRESSION:

1.  Status post right total hip arthroplasty postop day #3.

2.  Postoperative blood loss anemia.

 

PLAN:  She feels much better after getting the 2 units today.  She has been

up and ambulating rather well.  I did spend time describing the fracture of

the greater trochanter with her.  I explained the cable as well as the

cementing of the prosthesis.  She is fully aware of everything and seems to

understand.  I do want her to follow hip precautions; however, she does not

have any other restrictions and she is able to weightbear as tolerated.  Will

continue with aspirin 325 mg twice a day for DVT prophylaxis as well as LENORE

hose stockings.  Will plan to discharge her to home tomorrow morning.  I will

see her in the office on Monday, in a week from now to remove Prineo VAC

dressing.

## 2017-11-14 VITALS
SYSTOLIC BLOOD PRESSURE: 122 MMHG | DIASTOLIC BLOOD PRESSURE: 72 MMHG | HEART RATE: 77 BPM | TEMPERATURE: 97.88 F | OXYGEN SATURATION: 99 %

## 2017-11-14 VITALS
SYSTOLIC BLOOD PRESSURE: 122 MMHG | TEMPERATURE: 97.88 F | HEART RATE: 77 BPM | OXYGEN SATURATION: 99 % | DIASTOLIC BLOOD PRESSURE: 72 MMHG

## 2017-11-14 LAB
HCT VFR BLD CALC: 31.4 % (ref 37–47)
HGB BLD-MCNC: 10.2 G/DL (ref 12–16)

## 2017-11-14 RX ADMIN — Medication SCH TAB: at 08:39

## 2017-11-14 RX ADMIN — PANTOPRAZOLE SCH MG: 40 TABLET, DELAYED RELEASE ORAL at 08:38

## 2017-11-14 RX ADMIN — Medication SCH MG: at 08:38

## 2017-11-14 RX ADMIN — DOCUSATE SODIUM SCH MG: 100 CAPSULE, LIQUID FILLED ORAL at 08:38

## 2017-11-14 RX ADMIN — OXYCODONE HYDROCHLORIDE PRN MG: 5 TABLET ORAL at 02:58

## 2017-11-14 RX ADMIN — ACETAMINOPHEN SCH MG: 500 TABLET, COATED ORAL at 05:34

## 2017-11-14 RX ADMIN — BUDESONIDE AND FORMOTEROL FUMARATE DIHYDRATE SCH PUFFS: 160; 4.5 AEROSOL RESPIRATORY (INHALATION) at 08:38

## 2017-11-14 RX ADMIN — MONTELUKAST SODIUM SCH MG: 10 TABLET, FILM COATED ORAL at 08:38

## 2017-11-14 RX ADMIN — LISINOPRIL AND HYDROCHLOROTHIAZIDE SCH TAB: 25; 20 TABLET ORAL at 08:39

## 2017-11-14 RX ADMIN — Medication SCH MG: at 08:39

## 2017-11-14 RX ADMIN — SIMVASTATIN SCH MG: 20 TABLET, FILM COATED ORAL at 08:39

## 2017-11-14 RX ADMIN — KETOROLAC TROMETHAMINE SCH MG: 15 INJECTION INTRAMUSCULAR; INTRAVENOUS at 05:35

## 2017-11-14 RX ADMIN — OXYCODONE HYDROCHLORIDE PRN MG: 5 TABLET ORAL at 07:16

## 2017-11-14 NOTE — ORTHOPEDIC PROGRESS NOTE
DATE: 11/14/2017

 

CHIEF COMPLAINT:  She is postop day #3 total right hip arthroplasty.

 

HISTORY OF PRESENT ILLNESS:  Mary is a 67-year-old female who underwent

right total hip arthroplasty on Friday 11/10/2017.  She tolerated the

procedure well.  On visit this morning she is sitting in a chair at bedside. 

She is comfortable.  She has some minor complaints of some right hip pain. 

She has intermittent spells of dizziness and fatigue as well.

 

PHYSICAL EXAMINATION:

VITAL SIGNS:  36.6 degree Celsius orally.  Her pulse was 97, respiratory rate

was 16, blood pressure 118/73, pulse ox is 95 on room air.

EXTREMITIES:  The Prevena dressing is good to suction.  Leg lengths are

equal.

MUSCULOSKELETAL:  She has active dorsiflexion and plantarflexion to her right

ankle.  Good sensation in bilateral lower extremities.

 

LABORATORY DATA:  Her H&H this morning was 7.9 and 24.5.

 

IMPRESSION:

1.  Status post total right hip arthroplasty, postop day #3.

2.  Postoperative blood loss anemia.

 

PLAN:  With her H&H is low, we are going to get a 2 units of packed red blood

cells.  We will have her stay inpatient 1 more day.  We will anticipate

discharge to home tomorrow.  She expresses that she would home health to help

her when she gets discharged.  We will keep her today and monitor vital

signs.  We will have her continue following hip precautions.  Continue with

DVT prophylaxis with 325 mg of aspirin twice a day.  LENORE hose stockings.  We

will plan on discharge tomorrow.

 

 

 

 

JIM

## 2017-11-14 NOTE — PROGRESS NOTE
DATE: 11/14/2017

 

CHIEF COMPLAINT:  Status post right total hip arthroplasty, postop day #4.

 

PROGRESS:  Mary was seen and examined at bedside today.  Overall, she is

feeling much better.  She is awake and alert.  She says she feels the best she 
has since she

has been at the hospital.  She is looking forward to going home today.

 

PHYSICAL EXAMINATION:

RIGHT HIP:  The Prevena is to suction and intact.  Leg lengths are

essentially equal.  She has active dorsiflexion and plantarflexion of her

right ankle.  Sensation is intact throughout.

 

LABORATORY DATA:  She has an H&H today of 10.2 and 31.4.  Her vital signs are

all stable on room air.  She is voiding on her own.

 

IMPRESSION:

1.  Status post right total hip arthroplasty postop day #4.

2.  Acute blood loss anemia.

 

PLAN:  At this point, she seems to be doing well.  She is doing much better. 

She has been up and ambulating well with physical therapy.  We are going to

send her home today with [facility].

 

 

 

 

JIM

## 2017-11-14 NOTE — DISCHARGE SUMMARY
DISCHARGE DIAGNOSES:

1.  Primary osteoarthritis of the right hip.

2.  Acute blood loss anemia.

 

PROCEDURE:  Right total hip arthroplasty on 11/10/2017 by Dr. Magdiel Kirby.

 

DISCHARGE INSTRUCTIONS:

1.  Aspirin 325 twice a day for 6 weeks.

2.  Norco 5/325 one tab 3 times a day as needed for pain.

3.  Follow hip precautions.

4.  Weightbear as tolerated.

5.  Follow up with Dr. Kirby in 2 weeks.

6.  Call the office of Dr. Kirby with any questions or concerns.

7.  Albuterol 2 puffs 4 times a day as needed.

8.  Symbicort 2 puffs twice a day.

9.  Celebrex 200 mg daily.

10.  Vitamin D3 4000 units twice a week.

11.  Zestoretic 20/25 mg daily.

12.  Singulair 10 mg daily.

13.  Prednisone 5 mg daily.

14.  Zocor 20 mg daily.

14.  Magnesium 250 mg daily.

 

HOSPITAL COURSE:  Mary is a pleasant 67-year-old female who presented to my

office with complaints of chronic right hip and groin pain.  X-rays and

clinical examination were diagnostic for primary osteoarthritis of the right

hip.  After failing conservative treatment, she elected to undergo a right

total hip arthroplasty.  On 11/10/2017 she arrived at [facility].

 She had a spinal anesthetic and underwent a right hip replacement.  There

was a small crack in the proximal femur which required a cable fixation and

exchange of the stem.  This caused the procedure to take longer then

expected.  Postoperatively, she was started on aspirin for DVT prophylaxis

and discharged to general orthopedic floor.  Postoperative x-rays showed

anatomic alignment.  On day #1, her H&H was a little low at 8.5 and 25.2.  I

did give her 1 unit of packed red blood cells.  She did not see therapy on

day 1.  On day #2, her H&H was stable at 8.5 and 25.7.  She was seen by

physical therapy and able to ambulate around the room.  She was having

soreness in the hip, but was fairly well controlled.  On postop day #3, she

was feeling a little bit more washed out, she was slightly nauseous and her

H&H was 7.9 and 24.5.  I did give her 2 units of packed red blood cells. 

That made her feel a lot better.  She was able to ambulate well with physical

therapy on day 3 and was feeling much better.  On postop day #4, she was

feeling fine.  Her H&H was 10.2 and 31.4.  She was seen once again by

physical therapy and subsequently discharged to home with the above

instructions.  To note, she does have a Prevena dressing in place and I

will see her in the office on Monday a week from now to remove the Prevena

dressing.

 

 

 

JIM

## 2018-01-10 ENCOUNTER — HOSPITAL ENCOUNTER (OUTPATIENT)
Dept: HOSPITAL 45 - C.ULTRBC | Age: 68
Discharge: HOME | End: 2018-01-10
Attending: NURSE PRACTITIONER
Payer: COMMERCIAL

## 2018-01-10 DIAGNOSIS — M79.89: Primary | ICD-10-CM

## 2018-01-10 NOTE — DIAGNOSTIC IMAGING REPORT
BILATERAL LOWER EXTREMITY VENOUS DOPPLER



HISTORY:      LEG SWELLING



COMPARISON STUDY:  None.



FINDINGS: There is normal compressibility, flow, and augmentation within the

bilateral lower extremity deep venous systems.



IMPRESSION:  

No DVT within the right or left lower extremity.







Electronically signed by:  Subhash Qureshi M.D.

1/10/2018 10:57 AM



Dictated Date/Time:  1/10/2018 10:57 AM

## 2018-04-05 ENCOUNTER — HOSPITAL ENCOUNTER (OUTPATIENT)
Dept: HOSPITAL 45 - C.PATHSPEC | Age: 68
Discharge: HOME | End: 2018-04-05
Attending: DERMATOLOGY
Payer: COMMERCIAL

## 2018-04-05 DIAGNOSIS — L57.0: Primary | ICD-10-CM
